# Patient Record
Sex: FEMALE | Race: WHITE | HISPANIC OR LATINO
[De-identification: names, ages, dates, MRNs, and addresses within clinical notes are randomized per-mention and may not be internally consistent; named-entity substitution may affect disease eponyms.]

---

## 2019-07-02 PROBLEM — Z00.00 ENCOUNTER FOR PREVENTIVE HEALTH EXAMINATION: Status: ACTIVE | Noted: 2019-07-02

## 2019-07-11 ENCOUNTER — APPOINTMENT (OUTPATIENT)
Dept: MATERNAL FETAL MEDICINE | Facility: CLINIC | Age: 29
End: 2019-07-11
Payer: MEDICAID

## 2019-07-11 ENCOUNTER — APPOINTMENT (OUTPATIENT)
Dept: ANTEPARTUM | Facility: CLINIC | Age: 29
End: 2019-07-11
Payer: MEDICAID

## 2019-07-11 ENCOUNTER — ASOB RESULT (OUTPATIENT)
Age: 29
End: 2019-07-11

## 2019-07-11 PROCEDURE — 99213 OFFICE O/P EST LOW 20 MIN: CPT

## 2019-07-11 PROCEDURE — 99214 OFFICE O/P EST MOD 30 MIN: CPT

## 2022-08-01 ENCOUNTER — EMERGENCY (EMERGENCY)
Facility: HOSPITAL | Age: 32
LOS: 1 days | Discharge: HOME | End: 2022-08-01
Attending: STUDENT IN AN ORGANIZED HEALTH CARE EDUCATION/TRAINING PROGRAM | Admitting: STUDENT IN AN ORGANIZED HEALTH CARE EDUCATION/TRAINING PROGRAM
Payer: MEDICAID

## 2022-08-01 VITALS
HEIGHT: 64 IN | WEIGHT: 139.99 LBS | OXYGEN SATURATION: 100 % | SYSTOLIC BLOOD PRESSURE: 117 MMHG | HEART RATE: 88 BPM | DIASTOLIC BLOOD PRESSURE: 68 MMHG | RESPIRATION RATE: 16 BRPM

## 2022-08-01 LAB
ANION GAP SERPL CALC-SCNC: 5 MMOL/L — SIGNIFICANT CHANGE UP (ref 5–17)
APPEARANCE UR: CLEAR — SIGNIFICANT CHANGE UP
BACTERIA # UR AUTO: ABNORMAL
BASOPHILS # BLD AUTO: 0.05 K/UL — SIGNIFICANT CHANGE UP (ref 0–0.2)
BASOPHILS NFR BLD AUTO: 0.5 % — SIGNIFICANT CHANGE UP (ref 0–2)
BILIRUB UR-MCNC: NEGATIVE — SIGNIFICANT CHANGE UP
BUN SERPL-MCNC: 10 MG/DL — SIGNIFICANT CHANGE UP (ref 7–23)
CALCIUM SERPL-MCNC: 8.7 MG/DL — SIGNIFICANT CHANGE UP (ref 8.5–10.1)
CHLORIDE SERPL-SCNC: 108 MMOL/L — SIGNIFICANT CHANGE UP (ref 96–108)
CO2 SERPL-SCNC: 25 MMOL/L — SIGNIFICANT CHANGE UP (ref 22–31)
COLOR SPEC: SIGNIFICANT CHANGE UP
CREAT SERPL-MCNC: 0.64 MG/DL — SIGNIFICANT CHANGE UP (ref 0.5–1.3)
DIFF PNL FLD: ABNORMAL
EGFR: 120 ML/MIN/1.73M2 — SIGNIFICANT CHANGE UP
EOSINOPHIL # BLD AUTO: 0.15 K/UL — SIGNIFICANT CHANGE UP (ref 0–0.5)
EOSINOPHIL NFR BLD AUTO: 1.4 % — SIGNIFICANT CHANGE UP (ref 0–6)
EPI CELLS # UR: SIGNIFICANT CHANGE UP
ETHANOL SERPL-MCNC: <10 MG/DL — SIGNIFICANT CHANGE UP (ref 0–10)
GLUCOSE SERPL-MCNC: 109 MG/DL — HIGH (ref 70–99)
GLUCOSE UR QL: NEGATIVE — SIGNIFICANT CHANGE UP
HCG SERPL-ACNC: <1 MIU/ML — SIGNIFICANT CHANGE UP
HCT VFR BLD CALC: 42.8 % — SIGNIFICANT CHANGE UP (ref 34.5–45)
HGB BLD-MCNC: 14.6 G/DL — SIGNIFICANT CHANGE UP (ref 11.5–15.5)
IMM GRANULOCYTES NFR BLD AUTO: 0.3 % — SIGNIFICANT CHANGE UP (ref 0–1.5)
KETONES UR-MCNC: NEGATIVE — SIGNIFICANT CHANGE UP
LEUKOCYTE ESTERASE UR-ACNC: ABNORMAL
LYMPHOCYTES # BLD AUTO: 2.65 K/UL — SIGNIFICANT CHANGE UP (ref 1–3.3)
LYMPHOCYTES # BLD AUTO: 25 % — SIGNIFICANT CHANGE UP (ref 13–44)
MAGNESIUM SERPL-MCNC: 2.2 MG/DL — SIGNIFICANT CHANGE UP (ref 1.6–2.6)
MCHC RBC-ENTMCNC: 31.1 PG — SIGNIFICANT CHANGE UP (ref 27–34)
MCHC RBC-ENTMCNC: 34.1 GM/DL — SIGNIFICANT CHANGE UP (ref 32–36)
MCV RBC AUTO: 91.3 FL — SIGNIFICANT CHANGE UP (ref 80–100)
MONOCYTES # BLD AUTO: 0.76 K/UL — SIGNIFICANT CHANGE UP (ref 0–0.9)
MONOCYTES NFR BLD AUTO: 7.2 % — SIGNIFICANT CHANGE UP (ref 2–14)
NEUTROPHILS # BLD AUTO: 6.95 K/UL — SIGNIFICANT CHANGE UP (ref 1.8–7.4)
NEUTROPHILS NFR BLD AUTO: 65.6 % — SIGNIFICANT CHANGE UP (ref 43–77)
NITRITE UR-MCNC: POSITIVE
NRBC # BLD: 0 /100 WBCS — SIGNIFICANT CHANGE UP (ref 0–0)
PCP SPEC-MCNC: SIGNIFICANT CHANGE UP
PH UR: 6 — SIGNIFICANT CHANGE UP (ref 5–8)
PLATELET # BLD AUTO: 225 K/UL — SIGNIFICANT CHANGE UP (ref 150–400)
POTASSIUM SERPL-MCNC: 4.1 MMOL/L — SIGNIFICANT CHANGE UP (ref 3.5–5.3)
POTASSIUM SERPL-SCNC: 4.1 MMOL/L — SIGNIFICANT CHANGE UP (ref 3.5–5.3)
PROT UR-MCNC: NEGATIVE — SIGNIFICANT CHANGE UP
RBC # BLD: 4.69 M/UL — SIGNIFICANT CHANGE UP (ref 3.8–5.2)
RBC # FLD: 11.9 % — SIGNIFICANT CHANGE UP (ref 10.3–14.5)
RBC CASTS # UR COMP ASSIST: SIGNIFICANT CHANGE UP /HPF (ref 0–4)
SODIUM SERPL-SCNC: 138 MMOL/L — SIGNIFICANT CHANGE UP (ref 135–145)
SP GR SPEC: 1.01 — SIGNIFICANT CHANGE UP (ref 1.01–1.02)
TSH SERPL-MCNC: 1.07 UIU/ML — SIGNIFICANT CHANGE UP (ref 0.36–3.74)
UROBILINOGEN FLD QL: NEGATIVE — SIGNIFICANT CHANGE UP
WBC # BLD: 10.59 K/UL — HIGH (ref 3.8–10.5)
WBC # FLD AUTO: 10.59 K/UL — HIGH (ref 3.8–10.5)
WBC UR QL: ABNORMAL

## 2022-08-01 PROCEDURE — G1004: CPT

## 2022-08-01 PROCEDURE — 93005 ELECTROCARDIOGRAM TRACING: CPT

## 2022-08-01 PROCEDURE — 84702 CHORIONIC GONADOTROPIN TEST: CPT

## 2022-08-01 PROCEDURE — 80048 BASIC METABOLIC PNL TOTAL CA: CPT

## 2022-08-01 PROCEDURE — 96360 HYDRATION IV INFUSION INIT: CPT

## 2022-08-01 PROCEDURE — 71045 X-RAY EXAM CHEST 1 VIEW: CPT

## 2022-08-01 PROCEDURE — 80307 DRUG TEST PRSMV CHEM ANLYZR: CPT

## 2022-08-01 PROCEDURE — 70450 CT HEAD/BRAIN W/O DYE: CPT | Mod: MG

## 2022-08-01 PROCEDURE — 93010 ELECTROCARDIOGRAM REPORT: CPT

## 2022-08-01 PROCEDURE — 71045 X-RAY EXAM CHEST 1 VIEW: CPT | Mod: 26

## 2022-08-01 PROCEDURE — 84443 ASSAY THYROID STIM HORMONE: CPT

## 2022-08-01 PROCEDURE — 99285 EMERGENCY DEPT VISIT HI MDM: CPT | Mod: 25

## 2022-08-01 PROCEDURE — 85025 COMPLETE CBC W/AUTO DIFF WBC: CPT

## 2022-08-01 PROCEDURE — 70450 CT HEAD/BRAIN W/O DYE: CPT | Mod: 26,MG

## 2022-08-01 PROCEDURE — 81001 URINALYSIS AUTO W/SCOPE: CPT

## 2022-08-01 PROCEDURE — 83735 ASSAY OF MAGNESIUM: CPT

## 2022-08-01 PROCEDURE — 99285 EMERGENCY DEPT VISIT HI MDM: CPT

## 2022-08-01 PROCEDURE — 36415 COLL VENOUS BLD VENIPUNCTURE: CPT

## 2022-08-01 RX ORDER — SODIUM CHLORIDE 9 MG/ML
1000 INJECTION INTRAMUSCULAR; INTRAVENOUS; SUBCUTANEOUS ONCE
Refills: 0 | Status: COMPLETED | OUTPATIENT
Start: 2022-08-01 | End: 2022-08-01

## 2022-08-01 RX ADMIN — SODIUM CHLORIDE 1000 MILLILITER(S): 9 INJECTION INTRAMUSCULAR; INTRAVENOUS; SUBCUTANEOUS at 20:07

## 2022-08-01 RX ADMIN — SODIUM CHLORIDE 1000 MILLILITER(S): 9 INJECTION INTRAMUSCULAR; INTRAVENOUS; SUBCUTANEOUS at 19:07

## 2022-08-01 NOTE — ED PROVIDER NOTE - NSFOLLOWUPCLINICS_GEN_ALL_ED_FT
[facility]  Neurology Epilepsy  [address] [place] [address], [address] [place] [id number]  Phone: [phone]  Fax: [phone]

## 2022-08-01 NOTE — ED ADULT NURSE NOTE - OBJECTIVE STATEMENT
Pt BIBA, as per family pt was found on the floor, Narcan was given on the field by EMS and Police, pt awaken for brief moment as per EMS. Upon arrival pt is drowsy, responsive to verbal stimuli, NAD. On monitor, safety maintained, Nursing care ongoing.

## 2022-08-01 NOTE — ED PROVIDER NOTE - CLINICAL SUMMARY MEDICAL DECISION MAKING FREE TEXT BOX
Patient presenting here with concern for change in mental status.  Unknown etiology at this time.  There is reported drug use per EMS and she did have some response with Narcan.  However she is not completely awake and alert at this time.  No evidence of trauma on exam, however will assess with CT to rule out intracranial injury.  There is no reported convulsions, less likely seizure.  Also no reported history of seizures.  Suspect  underlying tox at this time.  We will keep on continuous pulse ox.  Accu-Chek reportedly 125.  Plan to monitor at this time.

## 2022-08-01 NOTE — ED PROVIDER NOTE - OBJECTIVE STATEMENT
Patient with no significant past medical history presenting with concern for tender mental status.  Unable to provide history at this time.  Per EMS, patient was found down in her house.  Upon their arrival she was not very responsive.  Was given Narcan by police department and then by paramedics with some response.  Patient's sister-in-law at bedside denies any history of drug use, though EMS states that there has been reported drug use.

## 2022-08-01 NOTE — ED PROVIDER NOTE - PATIENT PORTAL LINK FT
You can access the FollowMyHealth Patient Portal offered by [facility] by registering at the following website: [email or web address]. By joining [facility]’s FollowMyHealth portal, you will also be able to view your health information using other applications (apps) compatible with our system.

## 2022-08-01 NOTE — ED PROVIDER NOTE - MUSCULOSKELETAL, MLM
Spine appears normal, range of motion is not limited, no muscle or joint tenderness. No C, T, or L spine tenderness or obvious step-offs. no deformities seen

## 2022-08-01 NOTE — ED PROVIDER NOTE - CARE PROVIDER_API CALL
Mynor Clinton)  Neurology  [address]  [place] [address]  Phone: [phone]  Fax: ()-  Follow Up Time: 4-6 Days

## 2022-08-01 NOTE — ED PROVIDER NOTE - NEUROLOGICAL, MLM
intermittently alert, oriented to self and birthday. follows commands but somnolent. able to sit up.

## 2022-08-01 NOTE — ED PROVIDER NOTE - NSFOLLOWUPINSTRUCTIONS_ED_ALL_ED_FT
1) Follow-up with your Primary Medical Doctor or referred doctor. Call today / next business day for prompt follow-up.  2) Call 911 or go to the ED should your symptoms worsen, or should you develop any concerns whatsoever regarding your condition. Call 911 or return to the ED if you develop a fever greater than 101 F. Return to the ED if you develop chest pain, shortness of breath, weakness or ANY WORSENING OR CONCERNING SYMPTOM.  3)Your health is important to us. Should you have any concerns or questions about your condition, please do not hesitate to return to the Emergency Department.  4) See attached instruction sheets for additional information, including information regarding signs and symptoms to look out for, reasons to seek immediate care and other important instructions.  5) Follow-up with any specialists as discussed / noted as well.  6) If you were prescribed medications, please take them as prescribed.    Seizure, Adult    A seizure is a sudden burst of abnormal electrical and chemical activity in the brain. Seizures usually last from 30 seconds to 2 minutes.     What are the causes?    Common causes of this condition include:  •Fever or infection.  •Problems that affect the brain. These may include:  •A brain or head injury.  •Bleeding in the brain.  •A brain tumor.  •Low levels of blood sugar or salt.  •Kidney problems or liver problems.  •Conditions that are passed from parent to child (are inherited).    •Problems with a substance, such as:  •Having a reaction to a drug or a medicine.  •Stopping the use of a substance all of a sudden (withdrawal).  •A stroke.  •Disorders that affect how you develop.    Sometimes, the cause may not be known.     What increases the risk?  •Having someone in your family who has epilepsy. In this condition, seizures happen again and again over time. They have no clear cause.  •Having had a tonic–clonic seizure before. This type of seizure causes you to:  •Tighten the muscles of the whole body.  •Lose consciousness.    •Having had a head injury or strokes before.  •Having had a lack of oxygen at birth.    What are the signs or symptoms?    There are many types of seizures. The symptoms vary depending on the type of seizure you have.  Symptoms during a seizure   •Shaking that you cannot control (convulsions) with fast, jerky movements of muscles.  •Stiffness of the body.  •Breathing problems.  •Feeling mixed up (confused).  •Staring or not responding to sound or touch.  •Head nodding.  •Eyes that blink, flutter, or move fast.  •Drooling, grunting, or making clicking sounds with your mouth  •Losing control of when you pee or poop.    Symptoms before a seizure   •Feeling afraid, nervous, or worried.  •Feeling like you may vomit.  •Feeling like:  •You are moving when you are not.  •Things around you are moving when they are not.  •Feeling like you saw or heard something before (juanito spence).  •Odd tastes or smells.  •Changes in how you see. You may see flashing lights or spots.    Symptoms after a seizure   •Feeling confused.  •Feeling sleepy.  •Headache.   •Sore muscles.    How is this treated?  If your seizure stops on its own, you will not need treatment. If your seizure lasts longer than 5 minutes, you will normally need treatment. Treatment may include:  •Medicines given through an IV tube.  •Avoiding things, such as medicines, that are known to cause your seizures.  •Medicines to prevent seizures.  •A device to prevent or control seizures.  •Surgery.  •A diet low in carbohydrates and high in fat (ketogenic diet).    Follow these instructions at home:    Medicines   •Take over-the-counter and prescription medicines only as told by your doctor.  •Avoid foods or drinks that may keep your medicine from working, such as alcohol.    Activity   •Follow instructions about driving, swimming, or doing things that would be dangerous if you had another seizure. Wait until your doctor says it is safe for you to do these things.  •If you live in the [place] ask your local department of motor vehicles when you can drive.  •Get a lot of rest.    Teaching others    •Teach friends and family what to do when you have a seizure. They should:  •Help you get down to the ground.  •Protect your head and body.  •Loosen any clothing around your neck.  •Turn you on your side.  •Know whether or not you need emergency care.  •Stay with you until you are better.    •Also, tell them what not to do if you have a seizure. Tell them:  •They should not hold you down.  •They should not put anything in your mouth.    General instructions   •Avoid anything that gives you seizures.  •Keep a seizure diary. Write down:  •What you remember about each seizure.  •What you think caused each seizure.    •Keep all follow-up visits.    Contact a doctor if:  •You have another seizure or seizures. Call the doctor each time you have a seizure.  •The pattern of your seizures changes.  •You keep having seizures with treatment.  •You have symptoms of being sick or having an infection.  •You are not able to take your medicine.    Get help right away if:  •You have any of these problems:  •A seizure that lasts longer than 5 minutes.  •Many seizures in a row and you do not feel better between seizures.  •A seizure that makes it harder to breathe.  •A seizure and you can no longer speak or use part of your body.  •You do not wake up right after a seizure.  •You get hurt during a seizure.  •You feel confused or have pain right after a seizure.    These symptoms may be an emergency. Get help right away. Call your local emergency services (911 in the [place]   • Do not wait to see if the symptoms will go away.   • Do not drive yourself to the hospital.     Summary  •A seizure is a sudden burst of abnormal electrical and chemical activity in the brain. Seizures normally last from 30 seconds to 2 minutes.  •Causes of seizures include illness, injury to the head, low levels of blood sugar or salt, and certain conditions.  •Most seizures will stop on their own in less than 5 minutes. Seizures that last longer than 5 minutes are a medical emergency and need treatment right away.  •Many medicines are used to treat seizures. Take over-the-counter and prescription medicines only as told by your doctor.    This information is not intended to replace advice given to you by your health care provider. Make sure you discuss any questions you have with your health care provider.    Document Revised: 06/25/2021 Document Reviewed: 06/25/2021    Elsevier Patient Education © 2022 [employer]

## 2022-08-01 NOTE — ED ADULT TRIAGE NOTE - CHIEF COMPLAINT QUOTE
patient found on the floor as per family- patient received narcan from the police at the scene, patient also received inj narcan 2mg IV by ems-patient awoke crying, calling for her daughter- in [facility], patient moaning on calling her name, fsbs by ems- 125

## 2022-08-01 NOTE — ED PROVIDER NOTE - PROGRESS NOTE DETAILS
patient awake and alert at this time. tolerating PO. Discussed findings with patient and patient sister-in-law at bedside.  Informed them that it is possible that this was a seizure.  Etiology may be from underlying urinary infection.  We will prescribe antibiotics at that time as well as recommend neurology follow-up.  Patient also instructed to not drive until being seen by neurologist.  Plan for discharge.  Patient and family member agreeable with plan.  All questions were answered.

## 2022-08-01 NOTE — ED ADULT NURSE NOTE - CHIEF COMPLAINT QUOTE
patient found on the floor as per family- patient received narcan from the police at the scene, patient also received inj narcan 2mg IV by ems-patient awoke crying, calling for her daughter- in [facility], patient moaning on calling her name, fsbs by ems- 125

## 2022-08-02 VITALS
SYSTOLIC BLOOD PRESSURE: 102 MMHG | TEMPERATURE: 99 F | DIASTOLIC BLOOD PRESSURE: 68 MMHG | RESPIRATION RATE: 18 BRPM | HEART RATE: 72 BPM | OXYGEN SATURATION: 100 %

## 2022-08-02 RX ORDER — CEPHALEXIN 500 MG
1 CAPSULE ORAL
Qty: 28 | Refills: 0
Start: 2022-08-02 | End: 2022-08-08

## 2023-01-10 ENCOUNTER — APPOINTMENT (OUTPATIENT)
Dept: NEUROLOGY | Facility: HOSPITAL | Age: 33
End: 2023-01-10

## 2023-01-31 ENCOUNTER — APPOINTMENT (OUTPATIENT)
Dept: NEUROLOGY | Facility: HOSPITAL | Age: 33
End: 2023-01-31